# Patient Record
(demographics unavailable — no encounter records)

---

## 2025-02-18 NOTE — PHYSICAL EXAM
[FreeTextEntry3] : General: well appearing person in nad, alert, pleasant Focused Skin Exam per patient preference: hyperpigmented thin plaque on left a/c fossa xerosis

## 2025-02-18 NOTE — ASSESSMENT
[Use of independent historian: [ enter independent historian's relationship to patient ] :____] : As the patient was unable to provide a complete and reliable history, I obtained clinical history from the patient's [unfilled] [FreeTextEntry1] : # Atopic derm chronic, improved but not at treatment goal - C/w Dupixent 300mg every 2 weeks.  - Dupixent benefits/risks discussed, including but not limited to injection site reactions, hypersensitivity, ocular SEs, immunosuppression and risk of infection (inc HSV reactivation, URI)  - Can restart TAC oint to body and Tacro oint to face PRN breakthrough rashes, SED  # Xerosis, chronic, stable - Principles of dry skin care reviewed including: importance of using an emollient 2-3x/day, using gentle products, and avoiding fragranced products including soaps and detergent  RTC 12 months or sooner prn

## 2025-02-18 NOTE — HISTORY OF PRESENT ILLNESS
[FreeTextEntry1] : f/u AD [de-identified] : 10 year old M w AD, last seen 6 months ago, tolerating dupixent well, here for f/u. Ran out of cream - started flaring on AC fossa. Using CeraVe moisturizer.